# Patient Record
Sex: FEMALE | Race: WHITE | NOT HISPANIC OR LATINO | URBAN - METROPOLITAN AREA
[De-identification: names, ages, dates, MRNs, and addresses within clinical notes are randomized per-mention and may not be internally consistent; named-entity substitution may affect disease eponyms.]

---

## 2024-07-11 PROBLEM — L91.8 SKIN TAG: Status: ACTIVE | Noted: 2024-07-11

## 2024-07-11 PROBLEM — F41.9 ANXIETY: Status: ACTIVE | Noted: 2024-07-11

## 2024-07-11 PROBLEM — F17.210 CIGARETTE SMOKER: Status: ACTIVE | Noted: 2024-07-11

## 2024-07-11 PROBLEM — K62.5 RECTAL BLEEDING: Status: ACTIVE | Noted: 2024-07-11

## 2024-07-11 PROBLEM — R39.15 URINARY URGENCY: Status: ACTIVE | Noted: 2024-07-11

## 2024-07-11 PROBLEM — G43.909 MIGRAINES: Status: ACTIVE | Noted: 2024-07-11

## 2024-07-11 PROBLEM — G47.00 INSOMNIA: Status: ACTIVE | Noted: 2024-07-11

## 2024-07-16 RX ORDER — DEXTROAMPHETAMINE SACCHARATE, AMPHETAMINE ASPARTATE, DEXTROAMPHETAMINE SULFATE AND AMPHETAMINE SULFATE 2.5; 2.5; 2.5; 2.5 MG/1; MG/1; MG/1; MG/1
10 TABLET ORAL
COMMUNITY
End: 2024-07-19 | Stop reason: WASHOUT

## 2024-07-16 RX ORDER — CITALOPRAM 20 MG/1
20 TABLET, FILM COATED ORAL
COMMUNITY
End: 2024-07-19 | Stop reason: WASHOUT

## 2024-07-19 ENCOUNTER — APPOINTMENT (OUTPATIENT)
Dept: PLASTIC SURGERY | Facility: CLINIC | Age: 39
End: 2024-07-19
Payer: COMMERCIAL

## 2024-07-19 VITALS — HEIGHT: 69 IN | WEIGHT: 131 LBS | BODY MASS INDEX: 19.4 KG/M2

## 2024-07-19 DIAGNOSIS — N64.4 BREAST PAIN: Primary | ICD-10-CM

## 2024-07-19 PROBLEM — M79.671 FOOT PAIN, BILATERAL: Status: ACTIVE | Noted: 2024-07-19

## 2024-07-19 PROBLEM — F98.8 ADD (ATTENTION DEFICIT DISORDER): Status: ACTIVE | Noted: 2024-07-19

## 2024-07-19 PROBLEM — M25.50 POLYARTHRALGIA: Status: ACTIVE | Noted: 2024-07-19

## 2024-07-19 PROBLEM — D17.20 LIPOMA OF EXTREMITY: Status: ACTIVE | Noted: 2024-07-19

## 2024-07-19 PROBLEM — M79.672 FOOT PAIN, BILATERAL: Status: ACTIVE | Noted: 2024-07-19

## 2024-07-19 PROBLEM — T78.40XA ALLERGY: Status: ACTIVE | Noted: 2024-07-19

## 2024-07-19 PROCEDURE — 3008F BODY MASS INDEX DOCD: CPT | Performed by: PLASTIC SURGERY

## 2024-07-19 PROCEDURE — 99202 OFFICE O/P NEW SF 15 MIN: CPT | Performed by: PLASTIC SURGERY

## 2024-07-19 RX ORDER — DEXTROAMPHETAMINE SACCHARATE, AMPHETAMINE ASPARTATE MONOHYDRATE, DEXTROAMPHETAMINE SULFATE AND AMPHETAMINE SULFATE 7.5; 7.5; 7.5; 7.5 MG/1; MG/1; MG/1; MG/1
CAPSULE, EXTENDED RELEASE ORAL
COMMUNITY
Start: 2024-07-03

## 2024-07-19 RX ORDER — BISMUTH SUBSALICYLATE 262 MG
1 TABLET,CHEWABLE ORAL DAILY
COMMUNITY

## 2024-07-19 RX ORDER — CITALOPRAM 40 MG/1
TABLET, FILM COATED ORAL
COMMUNITY
Start: 2024-07-05

## 2024-07-19 RX ORDER — TRIAMCINOLONE ACETONIDE 1 MG/G
CREAM TOPICAL
COMMUNITY
Start: 2024-02-23

## 2024-07-19 RX ORDER — IBUPROFEN 200 MG
TABLET ORAL
COMMUNITY

## 2024-07-19 ASSESSMENT — ENCOUNTER SYMPTOMS
UNEXPECTED WEIGHT CHANGE: 0
FEVER: 0
CHILLS: 0

## 2024-07-19 ASSESSMENT — PAIN SCALES - GENERAL: PAINLEVEL: 2

## 2024-07-19 NOTE — PROGRESS NOTES
Subjective   Patient ID: Abi Amado is a 39 y.o. female.    Breast Problem  Pt. States she underwent augmentation of breast in 2011 with saline submuscular implants. Pt found to have a mass on MRI of her breast. Mammogram negative. Pt was seen at [facility] and [facility].Also c/o rash and muscle aches, left breast pain    Review of Systems   Constitutional:  Negative for chills, fever and unexpected weight change.       Objective   Physical Exam  Pt was not examined due to complex and multifactorial issues  Assessment/Plan   There are no diagnoses linked to this encounter.    Discussed pts condition and need to refer to [facility]

## 2024-07-25 ENCOUNTER — HOSPITAL ENCOUNTER (OUTPATIENT)
Dept: RADIOLOGY | Facility: EXTERNAL LOCATION | Age: 39
Discharge: HOME | End: 2024-07-25

## 2024-07-25 DIAGNOSIS — R92.8 ABNORMAL MRI, BREAST: ICD-10-CM

## 2024-07-25 DIAGNOSIS — N63.42 SUBAREOLAR MASS OF LEFT BREAST: ICD-10-CM

## 2024-08-02 ENCOUNTER — HOSPITAL ENCOUNTER (OUTPATIENT)
Dept: RADIOLOGY | Facility: HOSPITAL | Age: 39
Discharge: HOME | End: 2024-08-02
Payer: COMMERCIAL

## 2024-08-02 ENCOUNTER — OFFICE VISIT (OUTPATIENT)
Dept: SURGICAL ONCOLOGY | Facility: HOSPITAL | Age: 39
End: 2024-08-02
Payer: COMMERCIAL

## 2024-08-02 VITALS
WEIGHT: 131 LBS | DIASTOLIC BLOOD PRESSURE: 62 MMHG | HEART RATE: 73 BPM | RESPIRATION RATE: 16 BRPM | SYSTOLIC BLOOD PRESSURE: 98 MMHG | BODY MASS INDEX: 19.4 KG/M2 | HEIGHT: 69 IN

## 2024-08-02 DIAGNOSIS — R92.8 ABNORMAL FINDING ON BREAST IMAGING: ICD-10-CM

## 2024-08-02 DIAGNOSIS — N63.42 SUBAREOLAR MASS OF LEFT BREAST: ICD-10-CM

## 2024-08-02 PROCEDURE — 99204 OFFICE O/P NEW MOD 45 MIN: CPT | Performed by: NURSE PRACTITIONER

## 2024-08-02 PROCEDURE — 76982 USE 1ST TARGET LESION: CPT | Mod: LT

## 2024-08-02 PROCEDURE — 76642 ULTRASOUND BREAST LIMITED: CPT | Mod: LT

## 2024-08-02 PROCEDURE — 99214 OFFICE O/P EST MOD 30 MIN: CPT | Performed by: NURSE PRACTITIONER

## 2024-08-02 PROCEDURE — 3008F BODY MASS INDEX DOCD: CPT | Performed by: NURSE PRACTITIONER

## 2024-08-02 NOTE — PROGRESS NOTES
[facility] [place]  Abi Amado female   1985 39 y.o.  [id number]      Chief Complaint  New patient, biopsy consultation.    History Of Present Illness  Abi Amado is a pleasant 39 y.o.  female seen in the breast center for biopsy consultation. She denies breast biopsy. She has a history of bilateral breast implants in . She has never had them replaced. She has family history of breast cancer in her mother, age 65.    BREAST IMAGING: Outside image consult 2024 indicates BI-RADS Category 4. Indeterminate MRI mass without mammographic or sonographic correlate. This is a unique finding in the left breast. Coupled with its palpable nature further evaluation with biopsy is warranted. However, as the mass extends into the nipple and as there is only 1-1/2 cm of tissue between the implant and  the abnormality, MRI guided biopsy would be extremely difficult if not impossible. At this time, repeat ultrasound evaluation should be performed with attention to any dilated ducts or intraductal masses.    From the provided clinical history, it appears the patient can pinpoint the abnormality with visual changes noted around the Teagan areolar region. A palpation guided biopsy or excision could be considered. As the finding is not overtly suspicious, close clinical follow-up and imaging follow-up could be obtained    No MRI evidence of malignancy in the right breast.    REPRODUCTIVE HISTORY: menarche age 12, , first birth age 21, did not breastfeed, OCP's x 5 years, premenopausal, LMP unknown s/p partial hysterectomy  and intact ovaries, extremely dense                                 FAMILY CANCER HISTORY:   Mother: Breast cancer age 65  Father: Colon cancer, age 50    Review of Systems  Constitutional:  Negative for appetite change, fatigue, fever and unexpected weight change.   HENT:  Negative for ear pain, hearing loss, nosebleeds, sore throat and trouble swallowing.    Eyes:   Negative for discharge, itching and visual disturbance.   Breast: As stated in HPI.  Respiratory:  Negative for cough, chest tightness and shortness of breath.    Cardiovascular:  Negative for chest pain, palpitations and leg swelling.   Gastrointestinal:  Negative for abdominal pain, constipation, diarrhea and nausea.   Endocrine: Positive for cold intolerance and heat intolerance, increased thirst, and excessive urination.  Genitourinary:  Negative for dysuria, frequency, hematuria, pelvic pain and vaginal bleeding.   Musculoskeletal:  Negative for arthralgias, back pain, gait problem, joint swelling and myalgias.   Skin:  Negative for color change and rash.   Allergic/Immunologic: Negative for environmental allergies and food allergies.   Neurological:  Negative for dizziness, tremors, speech difficulty, weakness, numbness and headaches.   Hematological:  Does not bruise/bleed easily. Positive for swollen glands.  Psychiatric/Behavioral:  Negative for agitation, dysphoric mood and sleep disturbance. The patient is not nervous/anxious.       Past Medical History  She has a past medical history of Abnormal uterine and vaginal bleeding, unspecified (2015), Carcinoma in situ of cervix, unspecified, and Encounter for other preprocedural examination (2015).    Surgical History  She has a past surgical history that includes Other surgical history (2014); Hysterectomy (2015);  section, classic (2014); Other surgical history (2014); and Cervical biopsy w/ loop electrode excision (2014).    Family History  Cancer-related family history includes Breast cancer in her mother.     Social History  She reports that she has been smoking cigarettes. She uses smokeless tobacco. She reports current alcohol use. She reports current drug use. Drug: Marijuana.    Allergies  Avocado oil, Banana, Codeine, Egg, Okra, and Tramadol    Medications  Current Outpatient Medications   Medication  Instructions    amphetamine-dextroamphetamine XR (Adderall XR) 30 mg 24 hr capsule     citalopram (CeleXA) 40 mg tablet     ibuprofen 200 mg tablet oral    multivitamin tablet 1 tablet, oral, Daily    triamcinolone (Kenalog) 0.1 % cream Apply thin coat twice daily to areas of eczema for 2 weeks, repeat only as needed       Last Recorded Vitals  Vitals:    08/02/24 0807   BP: 98/62   Pulse: 73   Resp: 16       Physical Exam  Chest:       Patient is alert and oriented x3 and in a relaxed and appropriate mood. Her gait is steady and hand grasps are equal. Sclera is clear. The breasts are nearly symmetrical. Bilateral breasts have well healed inframammary incisions. Bilateral implants are soft and mobile and the tissue is soft without palpable abnormalities, discrete nodules or masses. The skin and nipples appear normal. There is no cervical, supraclavicular or axillary lymphadenopathy.        Relevant Results and Imaging  BI interpretation of outside films 07/25/2024  BI interpretation of outside films 07/25/2024  BI interpretation of outside films 07/25/2024    Narrative  Interpreted By:  Shaka Jacobs,  STUDY:  BI INTERPRETATION OF OUTSIDE FILMS;  7/25/2024 2:45 pm; 7/25/2024  2:44 pm; 7/25/2024 2:43 pm    ACCESSION NUMBER(S):  [id number]; [id number]; [id number]    ORDERING CLINICIAN:  AUGUSTINE BOJORQUEZ    INDICATION:  The patient presents with outside imaging after evaluation for a  tender left breast lump. Outside evaluation described indeterminate  left subareolar breast mass.    This consultation was deemed medically necessary by the referring  health care provider, Dr. Augustine Bojorquez.    COMPARISON:  No relevant comparisons.    FINDINGS:  Mammographic Findings:    Density:  The breast tissue is extremely dense, which may limit the  sensitivity of mammography.    The patient was initially evaluated with contrast enhanced breast MRI  on 04/16/2024. Per the provided clinical history, the patient was  reporting a  Teagan areolar tender palpable abnormality.    The breast tissue is extremely dense. There is minimal background  parenchymal enhancement. There are bilateral retropectoral saline  implants noted.    Within the left breast, there is a subcentimeter enhancing mass which  extends into the left nipple. The signal is associated with some  minimally distended ducts. The patient did not report nipple  discharge however. There are no other suspicious enhancing masses or  areas of non mass enhancement identified in the left breast.    There are no suspicious masses or areas of non mass enhancement  identified in the right breast.    There is no axillary adenopathy. No internal mammary adenopathy.    The visualized upper abdominal structures are unremarkable.    The areolar mass on the left was recommended for further evaluation  with MRI directed imaging.    The left mammogram demonstrates no abnormalities. Sonographic  evaluation was also performed on 06/18/2024. The provided images  demonstrate no suspicious masses or collections. Some mildly  distended ducts are seen. No correlate was identified for the MRI  finding.    Impression  Indeterminate MRI mass without mammographic or sonographic correlate.  This is a unique finding in the left breast. Coupled with its  palpable nature further evaluation with biopsy is warranted. However,  as the mass extends into the nipple and as there is only 1-1/2 cm of  tissue between the implant and  the abnormality, MRI guided biopsy  would be extremely difficult if not impossible. At this time, repeat  ultrasound evaluation should be performed with attention to any  dilated ducts or intraductal masses.    From the provided clinical history, it appears the patient can  pinpoint the abnormality with visual changes noted around the Teagan  areolar region. A palpation guided biopsy or excision could be  considered. As the finding is not overtly suspicious, close clinical  follow-up and imaging  follow-up could be obtained    No MRI evidence of malignancy in the right breast.    I agree with the outside reports.    BI-RADS CATEGORY:  BI-RADS Category:  4 Suspicious.  Recommendation:  Surgical Consultation and Biopsy.  Recommended Date:  Immediate.  Laterality:  Left.    For any future breast imaging appointments, please call [phone]  ([phone]).      MACRO:  None    Signed by: Shaka Jacobs 7/25/2024 3:37 PM  Dictation workstation:   [id number]    I explained the results in depth, along with suggested explanation for follow up recommendations based on the testing results. BI-RADS Category 4      Visit Diagnosis  1. Abnormal finding on breast imaging  BI US guided breast localization and biopsy left    BI breast biopsy clip imaging          Assessment/Plan  Abnormal mammogram, left breast mass, no breast biopsy, history bilateral breast implants, family history of breast cancer, extremely dense    Plan:  Repeat ultrasound left breast and ultrasound guided core biopsy.    Patient Discussion/Summary  Proceed to biopsy. A breast radiology physician will perform the biopsy. Results are usually available in about 7 business days. I will call patient with results and instruct on next steps and plan.     IMPORTANT INFORMATION REGARDING YOUR RESULTS    If you receive medical information from My [facility] Personal Health Record (online chart) your results will be released into your chart. This means you may view or see results of your biopsy or procedure before I contact you directly. If this occurs, please call the office and we will discuss your results over the phone.    You can see your health information, review clinical summaries from office visits & test results online when you follow your health with MY  Chart, a personal health record. To sign up go to [email or web address]. If you need assistance with signing up or trouble getting into your account call [employer] Patient Line 24/7 at  [phone].    My office phone number is [phone]  if you need to get in touch with me or have additional questions or concerns. Thank you for choosing [facility] and trusting me as your healthcare provider. I look forward to seeing you again at your next office visit. I am honored to be a provider on your health care team and I remain dedicated to helping you achieve your health goals.       Juanita Vaughn, BISI-CNP

## 2024-08-08 ENCOUNTER — HOSPITAL ENCOUNTER (OUTPATIENT)
Dept: RADIOLOGY | Facility: HOSPITAL | Age: 39
Discharge: HOME | End: 2024-08-08
Payer: COMMERCIAL

## 2024-08-08 VITALS — BODY MASS INDEX: 19.34 KG/M2 | WEIGHT: 130.95 LBS

## 2024-08-08 DIAGNOSIS — R92.8 ABNORMAL FINDING ON BREAST IMAGING: Primary | ICD-10-CM

## 2024-08-08 DIAGNOSIS — N63.20 MASS OF LEFT BREAST, UNSPECIFIED QUADRANT: ICD-10-CM

## 2024-08-08 DIAGNOSIS — R92.8 ABNORMAL FINDING ON BREAST IMAGING: ICD-10-CM

## 2024-08-08 PROCEDURE — 76642 ULTRASOUND BREAST LIMITED: CPT | Mod: LT

## 2024-08-08 PROCEDURE — 76642 ULTRASOUND BREAST LIMITED: CPT | Mod: LEFT SIDE | Performed by: RADIOLOGY

## 2024-10-30 ENCOUNTER — HOSPITAL ENCOUNTER (OUTPATIENT)
Dept: RADIOLOGY | Facility: HOSPITAL | Age: 39
Discharge: HOME | End: 2024-10-30
Payer: COMMERCIAL

## 2024-10-30 VITALS — BODY MASS INDEX: 19.34 KG/M2 | WEIGHT: 130.95 LBS

## 2024-10-30 DIAGNOSIS — N63.20 MASS OF LEFT BREAST, UNSPECIFIED QUADRANT: ICD-10-CM

## 2024-10-30 DIAGNOSIS — R92.8 ABNORMAL FINDING ON BREAST IMAGING: ICD-10-CM

## 2024-10-30 PROCEDURE — 77049 MRI BREAST C-+ W/CAD BI: CPT

## 2024-10-30 PROCEDURE — 2550000001 HC RX 255 CONTRASTS: Performed by: NURSE PRACTITIONER

## 2024-10-30 PROCEDURE — 77049 MRI BREAST C-+ W/CAD BI: CPT | Performed by: STUDENT IN AN ORGANIZED HEALTH CARE EDUCATION/TRAINING PROGRAM

## 2024-10-30 PROCEDURE — A9575 INJ GADOTERATE MEGLUMI 0.1ML: HCPCS | Performed by: NURSE PRACTITIONER

## 2024-10-30 RX ORDER — GADOTERATE MEGLUMINE 376.9 MG/ML
11 INJECTION INTRAVENOUS
Status: COMPLETED | OUTPATIENT
Start: 2024-10-30 | End: 2024-10-30

## 2024-11-04 ENCOUNTER — PREP FOR PROCEDURE (OUTPATIENT)
Dept: SURGERY | Facility: HOSPITAL | Age: 39
End: 2024-11-04
Payer: COMMERCIAL

## 2024-11-05 ENCOUNTER — TELEPHONE (OUTPATIENT)
Dept: SURGICAL ONCOLOGY | Facility: HOSPITAL | Age: 39
End: 2024-11-05
Payer: COMMERCIAL

## 2024-11-05 PROBLEM — R92.8 ABNORMAL FINDING ON BREAST IMAGING: Status: ACTIVE | Noted: 2024-11-05

## 2024-11-05 NOTE — PROGRESS NOTES
[facility]  Abi Amado female   1985 39 y.o.  [id number]      Chief Complaint  Follow up biopsy consultation.    History Of Present Illness  Abi Amado is a pleasant 39 y.o.  female seen in the breast center for biopsy consultation. She has a history of bilateral breast implants in . She has never had them replaced. She would like a referral to get these replaced and revised as they are causing her pain. She has family history of breast cancer in her mother, age 65.    BREAST IMAGING: 10/30/2024 Full breast MRI, indicates BI-RADS Category 4. Stable irregular enhancing mass in the left subareolar region  remain suspicious. A repeat attempt for ultrasound-guided biopsy could be considered. If the finding is not visualized on ultrasound, surgical excisional biopsy is recommended. No MRI evidence of malignancy in the right breast. Further evaluation with surgical consultation and ultrasound-guided biopsy is recommended.     REPRODUCTIVE HISTORY: menarche age 12, , first birth age 21, did not breastfeed, OCP's x 5 years, premenopausal, LMP unknown s/p partial hysterectomy  and intact ovaries, extremely dense                                 FAMILY CANCER HISTORY:   Mother: Breast cancer age 65  Father: Colon cancer, age 50    Review of Systems  Constitutional:  Negative for appetite change, fatigue, fever and unexpected weight change.   HENT:  Negative for ear pain, hearing loss, nosebleeds, sore throat and trouble swallowing.    Eyes:  Negative for discharge, itching and visual disturbance.   Breast: As stated in HPI.  Respiratory:  Negative for cough, chest tightness and shortness of breath.    Cardiovascular:  Negative for chest pain, palpitations and leg swelling.   Gastrointestinal:  Negative for abdominal pain, constipation, diarrhea and nausea.   Endocrine: Negative for cold intolerance and heat intolerance.   Genitourinary:  Negative for dysuria, frequency,  hematuria, pelvic pain and vaginal bleeding.   Musculoskeletal:  Negative for arthralgias, back pain, gait problem, joint swelling and myalgias.   Skin:  Negative for color change and rash.   Allergic/Immunologic: Negative for environmental allergies and food allergies.   Neurological:  Negative for dizziness, tremors, speech difficulty, weakness, numbness and headaches.   Hematological:  Does not bruise/bleed easily.   Psychiatric/Behavioral:  Negative for agitation, dysphoric mood and sleep disturbance. The patient is not nervous/anxious.         Past Medical History  She has a past medical history of Abnormal uterine and vaginal bleeding, unspecified (2015), Carcinoma in situ of cervix, unspecified, and Encounter for other preprocedural examination (2015).    Surgical History  She has a past surgical history that includes Other surgical history (2014); Hysterectomy (2015);  section, classic (2014); Other surgical history (2014); and Cervical biopsy w/ loop electrode excision (2014).    Family History  Cancer-related family history includes Breast cancer in her mother.     Social History  She reports that she has been smoking cigarettes. She uses smokeless tobacco. She reports current alcohol use. She reports current drug use. Drug: Marijuana.    Allergies  Avocado oil, Banana, Codeine, Egg, Okra, and Tramadol    Medications  Current Outpatient Medications   Medication Instructions    amphetamine-dextroamphetamine XR (Adderall XR) 30 mg 24 hr capsule     citalopram (CeleXA) 40 mg tablet     ibuprofen 200 mg tablet oral    multivitamin tablet 1 tablet, oral, Daily    triamcinolone (Kenalog) 0.1 % cream Apply thin coat twice daily to areas of eczema for 2 weeks, repeat only as needed       Last Recorded Vitals  Vitals:    24 1019   BP: 96/58   Pulse: (!) 121         Physical Exam  Chest:         Patient is alert and oriented x3 and in a relaxed and appropriate  mood. Her gait is steady and hand grasps are equal. Sclera is clear. The breasts are nearly symmetrical. Bilateral breasts have well healed inframammary incisions. Bilateral implants are soft and mobile and the tissue is soft without palpable abnormalities, discrete nodules or masses. The skin and nipples appear normal. There is no cervical, supraclavicular or axillary lymphadenopathy.        Relevant Results and Imaging  Study Result    Narrative   Interpreted By:  Escobar Bonilla,  STUDY:  BI MR BREAST BILATERAL WITH CONTRAST FULL PROTOCOL;  10/30/2024 6:01  pm      ACCESSION NUMBER(S):  [id number]      ORDERING CLINICIAN:  IRVIN URBINA      INDICATION:  Follow-up of a suspicious intraductal left breast mass initially seen  on outside facility dated 04/16/2024 with an intraductal mass  identified on ultrasound dated 08/02/2024 which was not visualized at  the time of planned ultrasound-guided biopsy. Surgical consultation  and follow-up with MRI was recommended. Bilateral saline implant  augmentation. Family history of breast cancer.      ,R92.8 Other abnormal and inconclusive findings on diagnostic imaging  of breast,N63.20 Unspecified lump in the left breast, unspecified  quadrant      COMPARISON:  Breast MRI 04/16/2024, mammogram 06/18/2024, ultrasound 08/02/2024.      TECHNIQUE:  Using a dedicated breast coil, STIR axial and T1-weighted fat  saturation axial images of the breasts were obtained, the latter both  before and after intravenous administration of Gadolinium DTPA. On an  independent workstation, 3-D images were formulated using [email or web address]  including time enhancement curves, subtraction images and MIP images.      Intravenous contrast: 11 mL of Dotarem      FINDINGS:  Density: Extreme fibroglandular tissue.      There is symmetric mild bilateral background enhancement.      Bilateral retropectoral saline implants. No significant lela-implant  fluid is identified.      RIGHT BREAST:  No suspicious  mass or nonmass enhancement is  identified.      No axillary or internal mammary lymphadenopathy is appreciated.      LEFT BREAST:  An irregular enhancing mass is again identified in the  inferior slightly aspect of the subareolar region abutting the base  of the nipple measuring 0.9 x 0.7 x 0.7 cm (AP x TR x CC) (series  [id number], image 84/222). This is similar in size and configuration  compared to prior MRI dated 04/16/2024. T1 hyperintense material is  noted in a few ducts in central breast at anterior depth, adjacent to  this mass suggestive of proteinaceous/hemorrhagic fluid. No  additional suspicious finding is identified.      No axillary or internal mammary lymphadenopathy is appreciated.      NON-BREAST FINDINGS:  None.       Impression   1. Stable irregular enhancing mass in the left subareolar region  remain suspicious. A repeat attempt for ultrasound-guided biopsy  could be considered. If the finding is not visualized on ultrasound,  surgical excisional biopsy is recommended.      2. No MRI evidence of malignancy in the right breast.      Further evaluation with surgical consultation and ultrasound-guided  biopsy is recommended.      A message was sent to the referring clinician at the time of this  dictation regarding these findings using the Epic critical findings  reporting system.      Method of Detection: Category Pat - Patient Reported Self-examination  Finding      BI-RADS CATEGORY:  BI-RADS Category:  4 Suspicious.  Recommendation:  Surgical Consultation and Biopsy.  Recommended Date:  Immediate.  Laterality:  Left.      For any future breast imaging appointments, please call [phone] ([phone]).          MACRO:  Critical Finding:  Breast Imaging Abnormality. Notification was  initiated on 11/1/2024 at 2:37 pm by  Escobar Bonilla.  (**-YCF-**)  Instructions:  Surgical Consultation and Imaging Guided Biopsy.      Signed by: Escobar Bonilla 11/1/2024 2:38 PM     I explained the results in depth,  along with suggested explanation for follow up recommendations based on the testing results. BI-RADS Category 4      Visit Diagnosis  1. Abnormal finding on breast imaging  BI US breast limited left    BI US guided breast localization and biopsy left    BI breast biopsy clip imaging          Assessment/Plan  Indeterminate left breast mass, history bilateral breast implants, family history of breast cancer, extremely dense    Plan:  Repeat left breast ultrasound guided core biopsy. If unable to complete biopsy surgical excision recommended.    Patient Discussion/Summary  Proceed to biopsy. A breast radiology physician will perform the biopsy. Results are usually available in about 7 business days. I will call patient with results and instruct on next steps and plan.     IMPORTANT INFORMATION REGARDING YOUR RESULTS    If you receive medical information from My [facility] Personal Health Record (online chart) your results will be released into your chart. This means you may view or see results of your biopsy or procedure before I contact you directly. If this occurs, please call the office and we will discuss your results over the phone.    You can see your health information, review clinical summaries from office visits & test results online when you follow your health with MY  Chart, a personal health record. To sign up go to [email or web address]. If you need assistance with signing up or trouble getting into your account call [employer] Patient Line 24/7 at [phone].    My office phone number is [phone]  if you need to get in touch with me or have additional questions or concerns. Thank you for choosing [facility] and trusting me as your healthcare provider. I look forward to seeing you again at your next office visit. I am honored to be a provider on your health care team and I remain dedicated to helping you achieve your health goals.       Juanita Vaughn, BISI-CNP

## 2024-11-05 NOTE — TELEPHONE ENCOUNTER
Result Communication    Apt follow up 11/7/24    Resulted Orders   MR breast bilateral w contrast full protocol    Narrative    Interpreted By:  Escobar Bonilla,   STUDY:  BI MR BREAST BILATERAL WITH CONTRAST FULL PROTOCOL;  10/30/2024 6:01  pm      ACCESSION NUMBER(S):  [id number]      ORDERING CLINICIAN:  IRVIN URBINA      INDICATION:  Follow-up of a suspicious intraductal left breast mass initially seen  on outside facility dated 04/16/2024 with an intraductal mass  identified on ultrasound dated 08/02/2024 which was not visualized at  the time of planned ultrasound-guided biopsy. Surgical consultation  and follow-up with MRI was recommended. Bilateral saline implant  augmentation. Family history of breast cancer.      ,R92.8 Other abnormal and inconclusive findings on diagnostic imaging  of breast,N63.20 Unspecified lump in the left breast, unspecified  quadrant      COMPARISON:  Breast MRI 04/16/2024, mammogram 06/18/2024, ultrasound 08/02/2024.      TECHNIQUE:  Using a dedicated breast coil, STIR axial and T1-weighted fat  saturation axial images of the breasts were obtained, the latter both  before and after intravenous administration of Gadolinium DTPA. On an  independent workstation, 3-D images were formulated using [employer]  including time enhancement curves, subtraction images and MIP images.      Intravenous contrast: 11 mL of Dotarem      FINDINGS:  Density: Extreme fibroglandular tissue.      There is symmetric mild bilateral background enhancement.      Bilateral retropectoral saline implants. No significant lela-implant  fluid is identified.      RIGHT BREAST:  No suspicious mass or nonmass enhancement is  identified.      No axillary or internal mammary lymphadenopathy is appreciated.      LEFT BREAST:  An irregular enhancing mass is again identified in the  inferior slightly aspect of the subareolar region abutting the base  of the nipple measuring 0.9 x 0.7 x 0.7 cm (AP x TR x CC)  (series  [id number], image 84/222). This is similar in size and configuration  compared to prior MRI dated 04/16/2024. T1 hyperintense material is  noted in a few ducts in central breast at anterior depth, adjacent to  this mass suggestive of proteinaceous/hemorrhagic fluid. No  additional suspicious finding is identified.      No axillary or internal mammary lymphadenopathy is appreciated.      NON-BREAST FINDINGS:  None.        Impression    1. Stable irregular enhancing mass in the left subareolar region  remain suspicious. A repeat attempt for ultrasound-guided biopsy  could be considered. If the finding is not visualized on ultrasound,  surgical excisional biopsy is recommended.      2. No MRI evidence of malignancy in the right breast.      Further evaluation with surgical consultation and ultrasound-guided  biopsy is recommended.      A message was sent to the referring clinician at the time of this  dictation regarding these findings using the Epic critical findings  reporting system.      Method of Detection: Category Pat - Patient Reported Self-examination  Finding      BI-RADS CATEGORY:  BI-RADS Category:  4 Suspicious.  Recommendation:  Surgical Consultation and Biopsy.  Recommended Date:  Immediate.  Laterality:  Left.      For any future breast imaging appointments, please call [phone] ([phone]).          MACRO:  Critical Finding:  Breast Imaging Abnormality. Notification was  initiated on 11/1/2024 at 2:37 pm by  Escobar Bonilla.  (**-YCF-**)  Instructions:  Surgical Consultation and Imaging Guided Biopsy.      Signed by: Escobar Bonilla 11/1/2024 2:38 PM  Dictation workstation:   [id number]       8:15 AM

## 2024-11-07 ENCOUNTER — OFFICE VISIT (OUTPATIENT)
Dept: SURGICAL ONCOLOGY | Facility: HOSPITAL | Age: 39
End: 2024-11-07
Payer: COMMERCIAL

## 2024-11-07 ENCOUNTER — HOSPITAL ENCOUNTER (OUTPATIENT)
Dept: RADIOLOGY | Facility: HOSPITAL | Age: 39
End: 2024-11-07
Payer: COMMERCIAL

## 2024-11-07 VITALS
HEART RATE: 121 BPM | BODY MASS INDEX: 19.26 KG/M2 | WEIGHT: 130 LBS | HEIGHT: 69 IN | DIASTOLIC BLOOD PRESSURE: 58 MMHG | SYSTOLIC BLOOD PRESSURE: 96 MMHG

## 2024-11-07 DIAGNOSIS — R92.8 ABNORMAL FINDING ON BREAST IMAGING: Primary | ICD-10-CM

## 2024-11-07 PROCEDURE — 99214 OFFICE O/P EST MOD 30 MIN: CPT | Performed by: NURSE PRACTITIONER

## 2024-11-07 PROCEDURE — 3008F BODY MASS INDEX DOCD: CPT | Performed by: NURSE PRACTITIONER

## 2024-11-12 ENCOUNTER — APPOINTMENT (OUTPATIENT)
Dept: GASTROENTEROLOGY | Facility: CLINIC | Age: 39
End: 2024-11-12
Payer: COMMERCIAL

## 2024-11-12 VITALS
RESPIRATION RATE: 16 BRPM | BODY MASS INDEX: 19.7 KG/M2 | DIASTOLIC BLOOD PRESSURE: 88 MMHG | HEIGHT: 69 IN | WEIGHT: 133 LBS | HEART RATE: 125 BPM | SYSTOLIC BLOOD PRESSURE: 126 MMHG | OXYGEN SATURATION: 98 %

## 2024-11-12 DIAGNOSIS — L91.8 SKIN TAG: Primary | ICD-10-CM

## 2024-11-12 PROCEDURE — 3008F BODY MASS INDEX DOCD: CPT | Performed by: NURSE PRACTITIONER

## 2024-11-12 PROCEDURE — 99213 OFFICE O/P EST LOW 20 MIN: CPT | Performed by: NURSE PRACTITIONER

## 2024-11-12 NOTE — PROGRESS NOTES
Subjective   Patient ID: Abi Amado is a 39 y.o. female who presents for Follow-up (States that she still has skin tag that was discussed on rectum. She states she has been dx with diverticulitis at colonoscopy. She is no longer experiencing rectal bleeding. ).  HPI  LOV 9/30/22:  37-year-old female with rectal bleeding. Rectal exam performed with internal/external hemorrhoids noted. No bleeding on exam or tenderness. Will check CBC to rule out anemia. Noted slight anemia in 2015.  Will proceed with colonoscopy due to painless rectal bleeding, despite positive rectal exam for hemorrhoids.  Unsure of family history for colon cancer. Need to rule out CR cancer versus bleeding polyp.  The procedure was discussed at length, including all possible risks. She will follow-up after her colonoscopy.    ->Colonoscopy 5/2023: due 2028 for surveillance   - The examined portion of the ileum was normal.                         - One 3 mm polyp at the hepatic flexure, removed with                          a cold snare. Resected and retrieved.                         - One 1 mm polyp at the hepatic flexure, removed with                          a cold biopsy forceps. Resected and retrieved.                         - Diverticulosis in the sigmoid colon to hepatic                          flexure.                         - External and internal hemorrhoids.    [facility] follow-up 11/12/2024:  Patient presents to the GI clinic with request for rectal skin tag and hemorrhoid removal.  No GI complaints.  No melena or hematochezia.  Weight and appetite are stable.  Review of Systems   All other systems reviewed and are negative.      Objective   Physical Exam  Vitals reviewed.   Constitutional:       General: She is awake. She is not in acute distress.     Appearance: Normal appearance. She is well-developed and normal weight. She is not ill-appearing, toxic-appearing or diaphoretic.   HENT:      Head: Normocephalic and atraumatic.    Eyes:      General: No scleral icterus.     Pupils: Pupils are equal, round, and reactive to light.   Neck:      Thyroid: No thyroid mass.      Trachea: Phonation normal.   Pulmonary:      Effort: Pulmonary effort is normal. No respiratory distress.      Breath sounds: Normal air entry. No decreased breath sounds.   Musculoskeletal:      Cervical back: Neck supple.      Right lower leg: No edema.      Left lower leg: No edema.   Skin:     General: Skin is warm.      Capillary Refill: Capillary refill takes less than 2 seconds.      Coloration: Skin is not cyanotic, jaundiced or pale.      Findings: No bruising.   Neurological:      General: No focal deficit present.      Mental Status: She is alert and oriented to person, place, and time. Mental status is at baseline.      Cranial Nerves: Cranial nerves 2-12 are intact.      Motor: Motor function is intact. No weakness.      Gait: Gait normal.   Psychiatric:         Attention and Perception: Attention and perception normal.         Mood and Affect: Mood normal.         Speech: Speech normal.         Behavior: Behavior normal. Behavior is cooperative.         Assessment/Plan   Diagnoses and all orders for this visit:  Skin tag  -     Referral to Colorectal Surgery; Future  39-year-old female status post colonoscopy in May, 2023.  Will be due in 2028 for surveillance.  No red flag symptoms noted.  Patient is requesting removal of a hemorrhoid and skin tag, will refer the patient to colorectal.  She can follow-up with GI as needed.         BISI Pritchard-CNP 11/12/24 11:28 AM

## 2024-11-14 ENCOUNTER — OFFICE VISIT (OUTPATIENT)
Dept: SURGERY | Facility: CLINIC | Age: 39
End: 2024-11-14
Payer: COMMERCIAL

## 2024-11-14 VITALS
TEMPERATURE: 98 F | DIASTOLIC BLOOD PRESSURE: 83 MMHG | BODY MASS INDEX: 18.96 KG/M2 | SYSTOLIC BLOOD PRESSURE: 116 MMHG | WEIGHT: 128 LBS | HEART RATE: 108 BPM | HEIGHT: 69 IN

## 2024-11-14 DIAGNOSIS — L91.8 SKIN TAG: ICD-10-CM

## 2024-11-14 PROCEDURE — 99203 OFFICE O/P NEW LOW 30 MIN: CPT | Performed by: NURSE PRACTITIONER

## 2024-11-14 PROCEDURE — 3008F BODY MASS INDEX DOCD: CPT | Performed by: NURSE PRACTITIONER

## 2024-11-14 PROCEDURE — 99213 OFFICE O/P EST LOW 20 MIN: CPT | Performed by: NURSE PRACTITIONER

## 2024-11-14 NOTE — PROGRESS NOTES
History Of Present Illness  Abi Amado is a 39 y.o. female presenting with an anal skin tag.     She has an anal skin tag that she has had for the past 2 years.  It has increased in size over the years.  No c/o any any bleeding or pain, it just interferes with cleanaing.    She has 1 soft Bm every day with rare straining.  She takes a fiber supplement daily.  She does not sit long on the toilet to have a BM.  No c/o any accidents or leakage of stool.  No hx of any perianal surgeries.  No hx of any vaginal births.      Colonoscopy 2023    Past Medical History  She has a past medical history of Abnormal uterine and vaginal bleeding, unspecified (2015), Carcinoma in situ of cervix, unspecified, and Encounter for other preprocedural examination (2015).    Surgical History  She has a past surgical history that includes Other surgical history (2014); Hysterectomy (2015);  section, classic (2014); Other surgical history (2014); and Cervical biopsy w/ loop electrode excision (2014).     Social History  She reports that she has been smoking cigarettes. She uses smokeless tobacco. She reports current alcohol use. She reports current drug use. Drug: Marijuana.    Family History  Family History   Problem Relation Name Age of Onset    Breast cancer Mother      Diverticulitis Father          Allergies  Avocado oil, Banana, Codeine, Egg, Okra, and Tramadol    Review of Systems   All other systems reviewed and are negative.       Physical Exam  Constitutional:       Appearance: Normal appearance.   HENT:      Head: Normocephalic and atraumatic.   Pulmonary:      Effort: Pulmonary effort is normal.   Genitourinary:     Comments: She has a medium sized anal skin tag in the posterior midline.  No inflammation or pain to the tag.  Musculoskeletal:         General: Normal range of motion.   Skin:     General: Skin is warm and dry.   Neurological:      General: No focal deficit  present.      Mental Status: She is alert and oriented to person, place, and time.   Psychiatric:         Mood and Affect: Mood normal.         Behavior: Behavior normal.          Last Recorded Vitals  /83   Pulse 108   Temp 36.7 °C (98 °F)   Wt 58.1 kg (128 lb)        Assessment/Plan   Abi has a posterior midline anal skin tag that she would like to have removed.  We talked about the risks and benefits of surgery and she would like to proceed.  I will schedule her to have that removed in the near future  by Dr. Garcia.  He will call with any issues and will follow up in post-op.         Sabrina Yin, APRN-CNP

## 2024-11-15 ENCOUNTER — HOSPITAL ENCOUNTER (OUTPATIENT)
Dept: RADIOLOGY | Facility: HOSPITAL | Age: 39
Discharge: HOME | End: 2024-11-15
Payer: COMMERCIAL

## 2024-11-15 DIAGNOSIS — R92.8 ABNORMAL FINDING ON BREAST IMAGING: ICD-10-CM

## 2024-11-15 PROCEDURE — 2500000004 HC RX 250 GENERAL PHARMACY W/ HCPCS (ALT 636 FOR OP/ED): Performed by: STUDENT IN AN ORGANIZED HEALTH CARE EDUCATION/TRAINING PROGRAM

## 2024-11-15 PROCEDURE — 2720000007 HC OR 272 NO HCPCS

## 2024-11-15 PROCEDURE — 19083 BX BREAST 1ST LESION US IMAG: CPT | Mod: LT

## 2024-11-15 PROCEDURE — 76642 ULTRASOUND BREAST LIMITED: CPT | Mod: LT

## 2024-11-15 PROCEDURE — 76983 USE EA ADDL TARGET LESION: CPT | Mod: LT

## 2024-11-15 PROCEDURE — A4648 IMPLANTABLE TISSUE MARKER: HCPCS

## 2024-11-20 LAB
LABORATORY COMMENT REPORT: NORMAL
PATH REPORT.FINAL DX SPEC: NORMAL
PATH REPORT.GROSS SPEC: NORMAL
PATH REPORT.RELEVANT HX SPEC: NORMAL
PATH REPORT.TOTAL CANCER: NORMAL

## 2024-11-21 ENCOUNTER — APPOINTMENT (OUTPATIENT)
Dept: RADIOLOGY | Facility: HOSPITAL | Age: 39
End: 2024-11-21
Payer: COMMERCIAL

## 2024-11-21 ENCOUNTER — TELEPHONE (OUTPATIENT)
Dept: SURGICAL ONCOLOGY | Facility: HOSPITAL | Age: 39
End: 2024-11-21
Payer: COMMERCIAL

## 2024-11-21 NOTE — TELEPHONE ENCOUNTER
Patient's  called concerned for implant rupture. He was notified today of benign biopsy results. She should call the office back if she is interested in continuing to be seen in the high risk clinic. High risk evaluation should be made in 6 months. Referral to plastic surgery given, Dr. Joseluis Cisneros.

## 2024-11-25 ENCOUNTER — OFFICE VISIT (OUTPATIENT)
Dept: PLASTIC SURGERY | Facility: CLINIC | Age: 39
End: 2024-11-25
Payer: COMMERCIAL

## 2024-11-25 VITALS — WEIGHT: 128 LBS | HEIGHT: 68 IN | BODY MASS INDEX: 19.4 KG/M2

## 2024-11-25 DIAGNOSIS — T85.43XA RUPTURE OF IMPLANT OF RIGHT BREAST, INITIAL ENCOUNTER: Primary | ICD-10-CM

## 2024-11-25 PROCEDURE — 99203 OFFICE O/P NEW LOW 30 MIN: CPT | Performed by: PHYSICIAN ASSISTANT

## 2024-11-25 PROCEDURE — 3008F BODY MASS INDEX DOCD: CPT | Performed by: PHYSICIAN ASSISTANT

## 2024-11-25 PROCEDURE — 99213 OFFICE O/P EST LOW 20 MIN: CPT | Performed by: PHYSICIAN ASSISTANT

## 2024-11-25 NOTE — PROGRESS NOTES
Department of Plastic and Reconstructive Surgery           Initial Office Consultation    Date: 11/25/24  Referring Provider: CHARLENE Madrigal  Primary Care Provider: Awais Laboy MD    Subjective   Abi Amado is a 39 y.o. female who was referred by CHARLENE Vaughn  for evaluation of ruptures breast implant.      She presents today with concerns for ruptured breast implant. She endorses that she was seen by the breast center for concerns of a subareolar breast mass. She underwent US breast, MRI, breast and eventual  biopsy. Biopsy revealed intraductal hyperplasia. She endorses that following the biopsy when she removed the compression bra she noted that her left breast implant appeared smaller and deformed. She has a history of cosmetic augmentation in 2010 with placement of submuscular saline implants.     PMH: anxiety  Surgical Hx: breast augmentation 2010, partial hysterectomy, left breast biopsy  Family Hx: mother BCA  Smoking: current daily vape use      Review of Systems   All other systems have been reviewed with the patient and have been found to be negative with exception to the chief complaint as mentioned in the history of present illness.    ROS: As noted in history of present illness    - CONSTITUTIONAL: Denies weight loss, fever and chills.  - HEENT: Denies changes in vision and hearing.  - RESPIRATORY: Denies SOB and cough, difficulty breathing  - CV: Denies palpitations and CP  - GI: Denies abdominal pain, nausea, vomiting and diarrhea.  - : Denies dysuria and urinary frequency.  - MSK: Denies myalgia and joint pain.  - SKIN: Denies rash and pruritus.  - NEUROLOGICAL: Denies headache and syncope.  -BREAST: positive for deformity of the left breast    Objective   Vital Signs: There were no vitals filed for this visit.    Gen: interactive and pleasant  Head: NCAT  Eyes: EOMI, PERRLA  Mouth: MMM  Throat: trachea midline  Cor: RRR  Pulm: nonlabored breathing  Abd:  s/nt/nd  Neuro: AAOx3  Ext: extremities perfused    Focused exam of the: breast                                R                  L  SN:NAC             22.5cm              21cm  NAC:IMF            10cm             9cm         BW                    14cm              14cm                                                                                              Ptosis Grade     2               2                                                         Left breast is visibly smaller in size compared to the right breast concerning for implant rupture. There is mild tenderness over the biopsy site of the left breast periareolar region with resolving ecchymosis.        Imaging  Biopsy left breast 11/15/24  ADDENDUM:  The final pathology for subareolar left breast mass demonstrates  intraductal papilloma with usual ductal hyperplasia. This is  concordant with the imaging findings.  Follow-up with patient's  surgical provider is recommended.      Estimated size of mass/extent of disease:  The mass measures 6 x 4 x  3 mm on US dated 11/15/2024.    MRI breast 10/30/24  MPRESSION:  1. Stable irregular enhancing mass in the left subareolar region  remain suspicious. A repeat attempt for ultrasound-guided biopsy  could be considered. If the finding is not visualized on ultrasound,  surgical excisional biopsy is recommended.      2. No MRI evidence of malignancy in the right breast.      Further evaluation with surgical consultation and ultrasound-guided  biopsy is recommended.      Assessment/Plan     Abi Amado is a 39 y.o. female who was referred by CHARLENE Vaughn for evaluation of ruptures breast implant.    She presents today for concerns of breast implant rupture. She underwent left breast MRI on 10/30 with no evidence of rupture of saline implants noted on imaging. I reviewed imaging and outer implant shell of the left breast appears intact. Patient then went on to have biopsy of suspicious breast mass on 11/15  and endorsed after she took off compression bra following procedure she noted the left breast to be smaller in size and deformity compared to the right breast that was new since biopsy. She underwent breast augmentation with saline implants in 2010. On exam the left breast appears visibly smaller than the right. The SN:NAC of the right measured 22.5cm and the left 21cm, NAC:IMF right 10cm and left 9cm.     We discussed bilateral implant exchange for likely rupture of left breast implant following breast biopsy. She is interested is bilateral implant replacement as her implants have been in place for greater than 10 years.     Plan:   Bilateral removal and replacement of breast implants for ruptured left breast implant   Smoking/vaping cessation    I spent 35 minutes with this patient. Greater than 50% of this time was spent in the counselling and/or coordination of care of this patient.  This note was created using voice recognition software and was not corrected for typographical or grammatical errors.    Signature: Sharmaine Foley PA-C

## 2025-01-06 ENCOUNTER — HOSPITAL ENCOUNTER (OUTPATIENT)
Facility: CLINIC | Age: 40
Setting detail: OUTPATIENT SURGERY
End: 2025-01-06
Attending: SURGERY | Admitting: SURGERY
Payer: COMMERCIAL

## 2025-01-06 ENCOUNTER — PREP FOR PROCEDURE (OUTPATIENT)
Dept: OCCUPATIONAL MEDICINE | Facility: HOSPITAL | Age: 40
End: 2025-01-06
Payer: COMMERCIAL

## 2025-01-06 DIAGNOSIS — T85.43XA SILICONE LEAKAGE FROM BREAST IMPLANT, INITIAL ENCOUNTER: Primary | ICD-10-CM

## 2025-01-07 ENCOUNTER — APPOINTMENT (OUTPATIENT)
Dept: PLASTIC SURGERY | Facility: CLINIC | Age: 40
End: 2025-01-07
Payer: COMMERCIAL

## 2025-08-11 ENCOUNTER — APPOINTMENT (OUTPATIENT)
Dept: PLASTIC SURGERY | Facility: CLINIC | Age: 40
End: 2025-08-11
Payer: COMMERCIAL

## 2025-08-28 ENCOUNTER — TELEPHONE (OUTPATIENT)
Facility: CLINIC | Age: 40
End: 2025-08-28
Payer: COMMERCIAL

## 2025-09-10 ENCOUNTER — APPOINTMENT (OUTPATIENT)
Facility: CLINIC | Age: 40
End: 2025-09-10
Payer: COMMERCIAL